# Patient Record
Sex: MALE | Race: WHITE | HISPANIC OR LATINO | Employment: UNEMPLOYED | URBAN - METROPOLITAN AREA
[De-identification: names, ages, dates, MRNs, and addresses within clinical notes are randomized per-mention and may not be internally consistent; named-entity substitution may affect disease eponyms.]

---

## 2023-01-23 ENCOUNTER — APPOINTMENT (EMERGENCY)
Dept: RADIOLOGY | Facility: HOSPITAL | Age: 38
End: 2023-01-23

## 2023-01-23 ENCOUNTER — HOSPITAL ENCOUNTER (EMERGENCY)
Facility: HOSPITAL | Age: 38
Discharge: HOME | End: 2023-01-23
Attending: EMERGENCY MEDICINE

## 2023-01-23 VITALS
OXYGEN SATURATION: 98 % | HEART RATE: 84 BPM | TEMPERATURE: 98.1 F | DIASTOLIC BLOOD PRESSURE: 86 MMHG | SYSTOLIC BLOOD PRESSURE: 168 MMHG | RESPIRATION RATE: 16 BRPM

## 2023-01-23 DIAGNOSIS — R55 NEAR SYNCOPE: Primary | ICD-10-CM

## 2023-01-23 DIAGNOSIS — R00.2 PALPITATIONS: ICD-10-CM

## 2023-01-23 LAB
ALBUMIN SERPL BCP-MCNC: 4.2 G/DL (ref 3.5–5)
ALP SERPL-CCNC: 82 U/L (ref 46–116)
ALT SERPL W P-5'-P-CCNC: 501 U/L (ref 12–78)
ANION GAP SERPL CALCULATED.3IONS-SCNC: 10 MMOL/L (ref 4–13)
AST SERPL W P-5'-P-CCNC: 201 U/L (ref 5–45)
ATRIAL RATE: 73 BPM
BASOPHILS # BLD AUTO: 0.02 THOUSANDS/ÂΜL (ref 0–0.1)
BASOPHILS NFR BLD AUTO: 0 % (ref 0–1)
BILIRUB SERPL-MCNC: 0.55 MG/DL (ref 0.2–1)
BUN SERPL-MCNC: 17 MG/DL (ref 5–25)
CALCIUM SERPL-MCNC: 9.4 MG/DL (ref 8.3–10.1)
CARDIAC TROPONIN I PNL SERPL HS: 2 NG/L
CHLORIDE SERPL-SCNC: 99 MMOL/L (ref 96–108)
CO2 SERPL-SCNC: 27 MMOL/L (ref 21–32)
CREAT SERPL-MCNC: 0.9 MG/DL (ref 0.6–1.3)
EOSINOPHIL # BLD AUTO: 0.1 THOUSAND/ÂΜL (ref 0–0.61)
EOSINOPHIL NFR BLD AUTO: 2 % (ref 0–6)
ERYTHROCYTE [DISTWIDTH] IN BLOOD BY AUTOMATED COUNT: 12.6 % (ref 11.6–15.1)
GFR SERPL CREATININE-BSD FRML MDRD: 107 ML/MIN/1.73SQ M
GLUCOSE SERPL-MCNC: 112 MG/DL (ref 65–140)
HCT VFR BLD AUTO: 45 % (ref 36.5–49.3)
HGB BLD-MCNC: 15.8 G/DL (ref 12–17)
IMM GRANULOCYTES # BLD AUTO: 0.02 THOUSAND/UL (ref 0–0.2)
IMM GRANULOCYTES NFR BLD AUTO: 0 % (ref 0–2)
LYMPHOCYTES # BLD AUTO: 1.3 THOUSANDS/ÂΜL (ref 0.6–4.47)
LYMPHOCYTES NFR BLD AUTO: 20 % (ref 14–44)
MCH RBC QN AUTO: 31.5 PG (ref 26.8–34.3)
MCHC RBC AUTO-ENTMCNC: 35.1 G/DL (ref 31.4–37.4)
MCV RBC AUTO: 90 FL (ref 82–98)
MONOCYTES # BLD AUTO: 0.67 THOUSAND/ÂΜL (ref 0.17–1.22)
MONOCYTES NFR BLD AUTO: 10 % (ref 4–12)
NEUTROPHILS # BLD AUTO: 4.51 THOUSANDS/ÂΜL (ref 1.85–7.62)
NEUTS SEG NFR BLD AUTO: 68 % (ref 43–75)
NRBC BLD AUTO-RTO: 0 /100 WBCS
P AXIS: 12 DEGREES
PLATELET # BLD AUTO: 287 THOUSANDS/UL (ref 149–390)
PMV BLD AUTO: 8.9 FL (ref 8.9–12.7)
POTASSIUM SERPL-SCNC: 3.5 MMOL/L (ref 3.5–5.3)
PR INTERVAL: 114 MS
PROT SERPL-MCNC: 7.9 G/DL (ref 6.4–8.4)
QRS AXIS: 88 DEGREES
QRSD INTERVAL: 100 MS
QT INTERVAL: 388 MS
QTC INTERVAL: 427 MS
RBC # BLD AUTO: 5.01 MILLION/UL (ref 3.88–5.62)
SODIUM SERPL-SCNC: 136 MMOL/L (ref 135–147)
T WAVE AXIS: 56 DEGREES
VENTRICULAR RATE: 73 BPM
WBC # BLD AUTO: 6.62 THOUSAND/UL (ref 4.31–10.16)

## 2023-01-23 NOTE — ED PROVIDER NOTES
History  Chief Complaint   Patient presents with   • Rapid Heart Rate     Pt states was at home working out when felt his heart rate increase  States stopped vaping last week and stopped doing a 500kcal deficit diet at the same time  Pt denies sick contacts  Patient is a 21-year-old male with no pertinent past medical history who reports he was working out approximately 1 hour ago at home doing 40 pound squats when he felt his heart rate shoot up to 160  States he felt near syncopal but did not pass out  Reports going down onto his knee  Currently reports mild right-sided chest pain  He also reports he "feels off"  Denies alcohol or illicit drug use  Denies headache  Denies neck pain  Denies radiation of pain to arms jaw teeth or back  No other complaints          None       History reviewed  No pertinent past medical history  History reviewed  No pertinent surgical history  History reviewed  No pertinent family history  I have reviewed and agree with the history as documented  E-Cigarette/Vaping   • E-Cigarette Use Former User      E-Cigarette/Vaping Substances     Social History     Tobacco Use   • Smoking status: Never   • Smokeless tobacco: Never   Vaping Use   • Vaping Use: Former   Substance Use Topics   • Alcohol use: Yes     Comment: social   • Drug use: Not Currently       Review of Systems   Constitutional: Negative for chills and fever  HENT: Negative for ear pain and sore throat  Respiratory: Negative for cough and shortness of breath  Cardiovascular: Negative for chest pain and palpitations  Gastrointestinal: Negative for abdominal pain, nausea and vomiting  Genitourinary: Negative for dysuria and hematuria  Musculoskeletal: Negative for arthralgias and back pain  Skin: Negative for color change and rash  Neurological: Negative for seizures and syncope  All other systems reviewed and are negative        Physical Exam  Physical Exam  Vitals and nursing note reviewed  Constitutional:       General: He is not in acute distress  Appearance: Normal appearance  He is not ill-appearing, toxic-appearing or diaphoretic  HENT:      Head: Normocephalic and atraumatic  Right Ear: Tympanic membrane, ear canal and external ear normal       Left Ear: Tympanic membrane, ear canal and external ear normal       Nose: Nose normal       Mouth/Throat:      Mouth: Mucous membranes are moist       Pharynx: Oropharynx is clear  Eyes:      Extraocular Movements: Extraocular movements intact  Conjunctiva/sclera: Conjunctivae normal       Pupils: Pupils are equal, round, and reactive to light  Cardiovascular:      Rate and Rhythm: Normal rate and regular rhythm  Pulses: Normal pulses  Heart sounds: Normal heart sounds  Pulmonary:      Effort: Pulmonary effort is normal       Breath sounds: Normal breath sounds  Abdominal:      General: Abdomen is flat  Bowel sounds are normal    Musculoskeletal:         General: Normal range of motion  Cervical back: Normal range of motion and neck supple  Skin:     General: Skin is warm and dry  Capillary Refill: Capillary refill takes less than 2 seconds  Neurological:      General: No focal deficit present  Mental Status: He is alert and oriented to person, place, and time  Mental status is at baseline           Vital Signs  ED Triage Vitals [01/23/23 1427]   Temperature Pulse Respirations Blood Pressure SpO2   98 1 °F (36 7 °C) 84 16 168/86 98 %      Temp Source Heart Rate Source Patient Position - Orthostatic VS BP Location FiO2 (%)   Tympanic Monitor Sitting Left arm --      Pain Score       --           Vitals:    01/23/23 1427   BP: 168/86   Pulse: 84   Patient Position - Orthostatic VS: Sitting         Visual Acuity      ED Medications  Medications - No data to display    Diagnostic Studies  Results Reviewed     Procedure Component Value Units Date/Time    HS Troponin I 4hr [[id number]]     Lab Status: No result Specimen: Blood     HS Troponin 0hr (reflex protocol) [[id number]]  (Normal) Collected: 01/23/23 1502    Lab Status: Final result Specimen: Blood from Arm, Left Updated: 01/23/23 1539     hs TnI 0hr 2 ng/L     HS Troponin I 2hr [[id number]]     Lab Status: No result Specimen: Blood     Comprehensive metabolic panel [[id number]]  (Abnormal) Collected: 01/23/23 1502    Lab Status: Final result Specimen: Blood from Arm, Left Updated: 01/23/23 1530     Sodium 136 mmol/L      Potassium 3 5 mmol/L      Chloride 99 mmol/L      CO2 27 mmol/L      ANION GAP 10 mmol/L      BUN 17 mg/dL      Creatinine 0 90 mg/dL      Glucose 112 mg/dL      Calcium 9 4 mg/dL       U/L       U/L      Alkaline Phosphatase 82 U/L      Total Protein 7 9 g/dL      Albumin 4 2 g/dL      Total Bilirubin 0 55 mg/dL      eGFR 107 ml/min/1 73sq m     Narrative:      [place] guidelines for Chronic Kidney Disease (CKD):   •  Stage 1 with normal or high GFR (GFR > 90 mL/min/1 73 square meters)  •  Stage 2 Mild CKD (GFR = 60-89 mL/min/1 73 square meters)  •  Stage 3A Moderate CKD (GFR = 45-59 mL/min/1 73 square meters)  •  Stage 3B Moderate CKD (GFR = 30-44 mL/min/1 73 square meters)  •  Stage 4 Severe CKD (GFR = 15-29 mL/min/1 73 square meters)  •  Stage 5 End Stage CKD (GFR <15 mL/min/1 73 square meters)  Note: GFR calculation is accurate only with a steady state creatinine    CBC and differential [[id number]] Collected: 01/23/23 1502    Lab Status: Final result Specimen: Blood from Arm, Left Updated: 01/23/23 1515     WBC 6 62 Thousand/uL      RBC 5 01 Million/uL      Hemoglobin 15 8 g/dL      Hematocrit 45 0 %      MCV 90 fL      MCH 31 5 pg      MCHC 35 1 g/dL      RDW 12 6 %      MPV 8 9 fL      Platelets 248 Thousands/uL      nRBC 0 /100 WBCs      Neutrophils Relative 68 %      Immat GRANS % 0 %      Lymphocytes Relative 20 %      Monocytes Relative 10 %      Eosinophils Relative 2 % Basophils Relative 0 %      Neutrophils Absolute 4 51 Thousands/µL      Immature Grans Absolute 0 02 Thousand/uL      Lymphocytes Absolute 1 30 Thousands/µL      Monocytes Absolute 0 67 Thousand/µL      Eosinophils Absolute 0 10 Thousand/µL      Basophils Absolute 0 02 Thousands/µL                  CT head without contrast   Final Result by Madison Kiran MD (01/23 1548)      No acute intracranial abnormality  Workstation performed: [id number]         XR chest 1 view    (Results Pending)              Procedures  ECG 12 Lead Documentation Only    Date/Time: 1/23/2023 4:44 PM  Performed by: Rupinder Chino PA-C  Authorized by: Rupinder Chino PA-C     Patient location:  ED  Rate:     ECG rate:  73    ECG rate assessment: normal    Rhythm:     Rhythm: sinus rhythm    Ectopy:     Ectopy: none    QRS:     QRS axis:  Normal    QRS intervals:  Normal  Conduction:     Conduction: normal    ST segments:     ST segments:  Normal  T waves:     T waves: normal               ED Course             HEART Risk Score    Flowsheet Row Most Recent Value   Heart Score Risk Calculator    History 0 Filed at: 01/23/2023 1644   ECG 0 Filed at: 01/23/2023 1644   Age 0 Filed at: 01/23/2023 1644   Risk Factors 0 Filed at: 01/23/2023 1644   Troponin 0 Filed at: 01/23/2023 1644   HEART Score 0 Filed at: 01/23/2023 1644                        SBIRT 20yo+    Flowsheet Row Most Recent Value   SBIRT (23 yo +)    In order to provide better care to our patients, we are screening all of our patients for alcohol and drug use  Would it be okay to ask you these screening questions? No Filed at: 01/23/2023 1504                    Medical Decision Making  27-year-old male with near syncopal event while lifting weights  Patient with normal EKG  High-sensitivity troponin less than 2  Heart score 0  Pt feels improved  Wants to be discharged   Discussed with patient at length that can not r/o transient arrhythmia such as SVT and advised to avoid lifting weights and close cardiology follow up  Return precautions given  Patient noted with elevated transaminases  Reports history of hepatitis C for which he is not being treated  He was advised to follow up with GI specialist regarding this for treatment  States he will do this once insurance issues are settled  Near syncope: acute illness or injury  Palpitations: acute illness or injury  Amount and/or Complexity of Data Reviewed  Labs: ordered  Radiology: ordered  Disposition  Final diagnoses:   Near syncope   Palpitations     Time reflects when diagnosis was documented in both MDM as applicable and the Disposition within this note     Time User Action Codes Description Comment    1/23/2023  4:41 PM Thi Hernandez Add [R55] Near syncope     1/23/2023  4:41 PM Thi Hernandez Add [R00 2] Palpitations       ED Disposition     ED Disposition   Discharge    Condition   Stable    Date/Time   Mon Jan 23, 2023  4:41 PM    Comment   Juan Owen discharge to home/self care  Follow-up Information     Follow up With Specialties Details Why [address] George Amezquita MD Cardiology   [facility]  [phone]            Patient's Medications    No medications on file       No discharge procedures on file      PDMP Review     None          ED Provider  Electronically Signed by           Jodie Kern PA-C  01/23/23 6337 [facility] CHRISTOPHER Issa  01/24/23 4881

## 2023-01-23 NOTE — DISCHARGE INSTRUCTIONS
Stay hydrated    Follow up with cardiologist Dr Aayush Thurston for further evaluation   Call for appointment    Avoid lifting weights until seen in follow up     Return to ED for worsening chest pain, new or worsening symptoms

## 2023-01-27 ENCOUNTER — CONSULT (OUTPATIENT)
Dept: CARDIOLOGY CLINIC | Facility: CLINIC | Age: 38
End: 2023-01-27

## 2023-01-27 VITALS
DIASTOLIC BLOOD PRESSURE: 70 MMHG | TEMPERATURE: 98 F | HEIGHT: 74 IN | OXYGEN SATURATION: 98 % | SYSTOLIC BLOOD PRESSURE: 130 MMHG | WEIGHT: 214 LBS | HEART RATE: 78 BPM | BODY MASS INDEX: 27.46 KG/M2

## 2023-01-27 DIAGNOSIS — R07.9 CHEST PAIN, UNSPECIFIED TYPE: Primary | ICD-10-CM

## 2023-01-27 NOTE — PROGRESS NOTES
[facility]  [address] [place] [address] [address]   [place], [address]    Cardiology Consultation    Juan Owen  [id number]  1985      Consult for: Chest pain  Appreciate consult by: No primary care provider on file  Discussion/Summary:     1  Chest pain, unspecified type  POCT ECG         Patient with atypical symptoms which occurred with exertion  He is low risk for cardiovascular events but given ongoing symptoms, will have treadmill stress test done   -He had elevated liver function testing which may be related to hepatitis C  He should follow-up with gastroenterology and have abdominal ultrasound done   -We will obtain lipid panel  Manger of blood work done recently was reviewed  Potassium levels were mildly reduced  HPI:     Juan Owen is a 44 y o  here for evaluation of chest pain and near syncope  Patient has had episodes of tightness and pain in his right shoulder/neck and chest that occurs at various times  he has had multiple episodes over the past few weeks  He went to the emergency room after developing symptoms while exercising  Work-up in the emergency room was negative except he had elevated liver function testing  He does have a history of hepatitis C  He denies any illicit drug use  There is no inciting events  He sometimes gets symptoms at rest as well  He has had a large amount of stress recently  No past medical history on file  Social History     Tobacco Use   • Smoking status: Never   • Smokeless tobacco: Never   Vaping Use   • Vaping Use: Former   Substance Use Topics   • Alcohol use: Yes     Comment: social   • Drug use: Not Currently      No family history on file  No past surgical history on file  No current outpatient medications on file  No Known Allergies    Review of Systems:   Review of Systems   Respiratory: Positive for chest tightness  Cardiovascular: Positive for chest pain   Negative for palpitations and leg swelling  Musculoskeletal: Positive for arthralgias and neck pain  All other systems reviewed and are negative  Physical Examination:     Vitals:    01/27/23 1020   BP: 130/70   BP Location: Left arm   Patient Position: Sitting   Cuff Size: Standard   Pulse: 78   Temp: 98 °F (36 7 °C)   SpO2: 98%   Weight: 97 1 kg (214 lb)   Height: 6' 2" (1 88 m)       Physical Exam   Constitutional: He appears healthy  No distress  Eyes: Pupils are equal, round, and reactive to light  Conjunctivae are normal    Neck: No JVD present  Cardiovascular: Normal rate, regular rhythm and normal heart sounds  Exam reveals no gallop and no friction rub  No murmur heard  Pulmonary/Chest: Effort normal and breath sounds normal  He has no wheezes  He has no rales  Musculoskeletal:         General: No tenderness, deformity or edema  Cervical back: Normal range of motion and neck supple  Neurological: He is alert and oriented to person, place, and time  Skin: Skin is warm and dry          Labs:     Lab Results   Component Value Date    WBC 6 62 01/23/2023    HGB 15 8 01/23/2023    HCT 45 0 01/23/2023    MCV 90 01/23/2023    RDW 12 6 01/23/2023     01/23/2023     BMP:  Lab Results   Component Value Date    SODIUM 136 01/23/2023    K 3 5 01/23/2023    CL 99 01/23/2023    CO2 27 01/23/2023    BUN 17 01/23/2023    CREATININE 0 90 01/23/2023    GLUC 112 01/23/2023    CALCIUM 9 4 01/23/2023    EGFR 107 01/23/2023     LFT:  Lab Results   Component Value Date     (H) 01/23/2023     (H) 01/23/2023    ALKPHOS 82 01/23/2023    TP 7 9 01/23/2023    ALB 4 2 01/23/2023      No results found for: [id number]  No results found for: HGBA1C  Lipid Profile:   No results found for: CHOLESTEROL, HDL, LDLCALC, TRIG  No results found for: CHOLESTEROL  No results found for: CKTOTAL, CKMB, CKMBINDEX, TROPONINI  No results found for: NTBNP   Recent Results (from the past 672 hour(s))   CBC and differential    Collection Time: 01/23/23  3:02 PM   Result Value Ref Range    WBC 6 62 4 31 - 10 16 Thousand/uL    RBC 5 01 3 88 - 5 62 Million/uL    Hemoglobin 15 8 12 0 - 17 0 g/dL    Hematocrit 45 0 36 5 - 49 3 %    MCV 90 82 - 98 fL    MCH 31 5 26 8 - 34 3 pg    MCHC 35 1 31 4 - 37 4 g/dL    RDW 12 6 11 6 - 15 1 %    MPV 8 9 8 9 - 12 7 fL    Platelets 810 279 - 652 Thousands/uL    nRBC 0 /100 WBCs    Neutrophils Relative 68 43 - 75 %    Immat GRANS % 0 0 - 2 %    Lymphocytes Relative 20 14 - 44 %    Monocytes Relative 10 4 - 12 %    Eosinophils Relative 2 0 - 6 %    Basophils Relative 0 0 - 1 %    Neutrophils Absolute 4 51 1 85 - 7 62 Thousands/µL    Immature Grans Absolute 0 02 0 00 - 0 20 Thousand/uL    Lymphocytes Absolute 1 30 0 60 - 4 47 Thousands/µL    Monocytes Absolute 0 67 0 17 - 1 22 Thousand/µL    Eosinophils Absolute 0 10 0 00 - 0 61 Thousand/µL    Basophils Absolute 0 02 0 00 - 0 10 Thousands/µL   Comprehensive metabolic panel    Collection Time: 01/23/23  3:02 PM   Result Value Ref Range    Sodium 136 135 - 147 mmol/L    Potassium 3 5 3 5 - 5 3 mmol/L    Chloride 99 96 - 108 mmol/L    CO2 27 21 - 32 mmol/L    ANION GAP 10 4 - 13 mmol/L    BUN 17 5 - 25 mg/dL    Creatinine 0 90 0 60 - 1 30 mg/dL    Glucose 112 65 - 140 mg/dL    Calcium 9 4 8 3 - 10 1 mg/dL     (H) 5 - 45 U/L     (H) 12 - 78 U/L    Alkaline Phosphatase 82 46 - 116 U/L    Total Protein 7 9 6 4 - 8 4 g/dL    Albumin 4 2 3 5 - 5 0 g/dL    Total Bilirubin 0 55 0 20 - 1 00 mg/dL    eGFR 107 ml/min/1 73sq m   HS Troponin 0hr (reflex protocol)    Collection Time: 01/23/23  3:02 PM   Result Value Ref Range    hs TnI 0hr 2 "Refer to ACS Flowchart"- see link ng/L   ECG 12 lead    Collection Time: 01/23/23  3:52 PM   Result Value Ref Range    Ventricular Rate 73 BPM    Atrial Rate 73 BPM    LA Interval 114 ms    QRSD Interval 100 ms    QT Interval 388 ms    QTC Interval 427 ms    P [place] 12 degrees    QRS Axis 88 degrees    T Wave Axis 56 degrees Imaging & Testing   I have personally reviewed pertinent reports  Cardiac Testing   No results found for this or any previous visit  EKG: Personally reviewed  Normal sinus rhythm with rate 75 bpm      Kayla Katz DO, [place]  [phone]  Please call with any questions

## 2023-11-30 ENCOUNTER — HOSPITAL ENCOUNTER (EMERGENCY)
Facility: HOSPITAL | Age: 38
Discharge: HOME | End: 2023-11-30
Attending: EMERGENCY MEDICINE
Payer: COMMERCIAL

## 2023-11-30 VITALS
DIASTOLIC BLOOD PRESSURE: 88 MMHG | HEIGHT: 74 IN | TEMPERATURE: 98.7 F | SYSTOLIC BLOOD PRESSURE: 158 MMHG | OXYGEN SATURATION: 98 % | RESPIRATION RATE: 18 BRPM | BODY MASS INDEX: 27.59 KG/M2 | HEART RATE: 87 BPM | WEIGHT: 215 LBS

## 2023-11-30 DIAGNOSIS — S46.911A STRAIN OF RIGHT SHOULDER, INITIAL ENCOUNTER: Primary | ICD-10-CM

## 2023-11-30 PROCEDURE — 99283 EMERGENCY DEPT VISIT LOW MDM: CPT

## 2023-11-30 PROCEDURE — 96372 THER/PROPH/DIAG INJ SC/IM: CPT

## 2023-11-30 PROCEDURE — 99284 EMERGENCY DEPT VISIT MOD MDM: CPT | Performed by: EMERGENCY MEDICINE

## 2023-11-30 RX ORDER — KETOROLAC TROMETHAMINE 30 MG/ML
15 INJECTION, SOLUTION INTRAMUSCULAR; INTRAVENOUS ONCE
Status: COMPLETED | OUTPATIENT
Start: 2023-11-30 | End: 2023-11-30

## 2023-11-30 RX ORDER — LIDOCAINE 50 MG/G
1 PATCH TOPICAL DAILY
Qty: 15 PATCH | Refills: 0 | Status: SHIPPED | OUTPATIENT
Start: 2023-11-30

## 2023-11-30 RX ORDER — NAPROXEN 500 MG/1
500 TABLET ORAL 2 TIMES DAILY WITH MEALS
Qty: 30 TABLET | Refills: 0 | Status: SHIPPED | OUTPATIENT
Start: 2023-11-30

## 2023-11-30 RX ORDER — LIDOCAINE 50 MG/G
1 PATCH TOPICAL ONCE
Status: DISCONTINUED | OUTPATIENT
Start: 2023-11-30 | End: 2023-11-30 | Stop reason: HOSPADM

## 2023-11-30 RX ADMIN — LIDOCAINE 1 PATCH: 50 PATCH CUTANEOUS at 10:38

## 2023-11-30 RX ADMIN — KETOROLAC TROMETHAMINE 15 MG: 30 INJECTION, SOLUTION INTRAMUSCULAR at 10:38

## 2023-11-30 NOTE — Clinical Note
Quin Rosales was seen and treated in our emergency department on 11/30/2023. Diagnosis:     Roge  . He may return on this date: 12/05/2023         If you have any questions or concerns, please don't hesitate to call.       Carlyle Beltran MD    ______________________________           _______________          _______________  Hospital Representative                              Date                                Time

## 2023-11-30 NOTE — DISCHARGE INSTRUCTIONS
We have given you prescription for Naprosyn to help with inflammation as well as Lidoderm numbing patches which you can put on your shoulder. We have provided you with a sling for comfort. Make sure you are taking your arm out of the sling a few times a day and taking your shoulder through a full range of motion. If you have any severe worsening of pain, new weakness or numbness, return to the emergency department. We have provided information to follow-up with orthopedic surgery in the next 1 to 2 weeks. We have also provided information for physical therapy.

## 2023-11-30 NOTE — ED PROVIDER NOTES
History  Chief Complaint   Patient presents with    Shoulder Pain     Right shoulder pain that began in neck, unable to move. Was lifting object 2 days ago over head and and lost control. HPI  Patient is a 50-year-old male presenting for evaluation of right-sided shoulder and neck pain. Patient states he was carrying a cast iron tub downstairs and states that he felt a pressure in his shoulder and now for the past 2 days has been having severe pain with moving his right arm. Patient denies any weakness or numbness of his right upper extremity but does have range of motion limited by his pain. Patient denies any prior injury to the same arm or shoulder. Patient states that he took 1000 mg Tylenol earlier this morning. None       History reviewed. No pertinent past medical history. History reviewed. No pertinent surgical history. History reviewed. No pertinent family history. I have reviewed and agree with the history as documented. E-Cigarette/Vaping    E-Cigarette Use Current Every Day User      E-Cigarette/Vaping Substances    Nicotine Yes      Social History     Tobacco Use    Smoking status: Never    Smokeless tobacco: Never   Vaping Use    Vaping Use: Every day    Substances: Nicotine   Substance Use Topics    Alcohol use: Yes     Comment: Pt states "I was just on a flores for two weeks and stopped four days ago"    Drug use: Not Currently       Review of Systems   Cardiovascular:  Negative for chest pain. Musculoskeletal:  Positive for arthralgias (Right shoulder) and neck pain. Negative for back pain. Neurological:  Negative for weakness and numbness. All other systems reviewed and are negative. Physical Exam  Physical Exam  Vitals and nursing note reviewed. Constitutional:       General: He is not in acute distress. Appearance: He is well-developed. He is not diaphoretic.       Comments: Uncomfortable appearing but nontoxic nondistressed   HENT:      Head: Normocephalic and atraumatic. Right Ear: External ear normal.      Left Ear: External ear normal.      Nose: Nose normal.      Mouth/Throat:      Pharynx: No oropharyngeal exudate. Eyes:      Conjunctiva/sclera: Conjunctivae normal.      Pupils: Pupils are equal, round, and reactive to light. Cardiovascular:      Rate and Rhythm: Normal rate and regular rhythm. Heart sounds: Normal heart sounds. No murmur heard. No friction rub. No gallop. Pulmonary:      Effort: Pulmonary effort is normal. No respiratory distress. Breath sounds: Normal breath sounds. No wheezing. Chest:      Chest wall: No tenderness. Abdominal:      General: Bowel sounds are normal. There is no distension. Palpations: Abdomen is soft. There is no mass. Tenderness: There is no abdominal tenderness. There is no guarding or rebound. Musculoskeletal:         General: No deformity. Comments: No midline C/T/L-spine tenderness, step-off, deformity. Spasm and severe tenderness of the right trapezius muscle and anterior shoulder. No overlying skin changes, swelling, ecchymosis. Able to abduct and abduct right shoulder despite pain. 2+ radial pulse. Intact  strength. Full sensation throughout right upper extremity. Skin:     General: Skin is warm and dry. Capillary Refill: Capillary refill takes less than 2 seconds. Neurological:      Mental Status: He is alert and oriented to person, place, and time.    Psychiatric:         Behavior: Behavior normal.         Vital Signs  ED Triage Vitals [11/30/23 1006]   Temperature Pulse Respirations Blood Pressure SpO2   98.7 °F (37.1 °C) 87 18 158/88 98 %      Temp Source Heart Rate Source Patient Position - Orthostatic VS BP Location FiO2 (%)   Oral Monitor Sitting Left arm --      Pain Score       4           Vitals:    11/30/23 1006   BP: 158/88   Pulse: 87   Patient Position - Orthostatic VS: Sitting         Visual Acuity      ED Medications  Medications   ketorolac (TORADOL) injection 15 mg (has no administration in time range)   lidocaine (LIDODERM) 5 % patch 1 patch (has no administration in time range)       Diagnostic Studies  Results Reviewed       None                   No orders to display              Procedures  Procedures         ED Course                               SBIRT 22yo+      Flowsheet Row Most Recent Value   Initial Alcohol Screen: US AUDIT-C     1. How often do you have a drink containing alcohol? 5 Filed at: 11/30/2023 1012   2. How many drinks containing alcohol do you have on a typical day you are drinking? 4 Filed at: 11/30/2023 1012   3a. Male UNDER 65: How often do you have five or more drinks on one occasion? 1 Filed at: 11/30/2023 1012   Audit-C Score 10 Filed at: 11/30/2023 1012                      Medical Decision Making  I obtained history from the patient. I reviewed external medical documentation. Patient was evaluated in emergency department on 1/23/2023 for rapid heart rate on exertion, had negative troponins at that time, followed up with cardiology as an outpatient. Patient with readily reproducible pain and tenderness likely representing a muscle strain based on patient's description of symptoms, physical exam.  Shared decision making performed and imaging deferred at this time. Treated symptomatically with Toradol, Lidoderm, placed in a shoulder sling, provided with orthopedics and physical therapy follow-up, discharged with return precautions.              Disposition  Final diagnoses:   Strain of right shoulder, initial encounter     Time reflects when diagnosis was documented in both MDM as applicable and the Disposition within this note       Time User Action Codes Description Comment    11/30/2023 10:25 AM Melvi Landaverde Add [D90.802Y] Strain of right shoulder, initial encounter           ED Disposition       ED Disposition   Discharge    Condition   Stable    Date/Time   Thu Nov 30, 2023 2615 [facility] discharge to home/self care. Follow-up Information       Follow up With Specialties Details Why Contact Info Additional Information    [address] Specialists [facility] Orthopedic Surgery   [address], [place] [address] [phone] [address] Specialists [facility], [address], [address], [place], [phone]    Physical Therapy at [facility] Physical Therapy   [facility] [address] [facility]  [phone] Physical Therapy at [facility], [address], [facility], [address]            Patient's Medications   Discharge Prescriptions    LIDOCAINE (LIDODERM) 5 %    Apply 1 patch topically over 12 hours daily Remove & Discard patch within 12 hours or as directed by MD       Start Date: 11/30/2023End Date: --       Order Dose: 1 patch       Quantity: 15 patch    Refills: 0    NAPROXEN (NAPROSYN) 500 MG TABLET    Take 1 tablet (500 mg total) by mouth 2 (two) times a day with meals       Start Date: 11/30/2023End Date: --       Order Dose: 500 mg       Quantity: 30 tablet    Refills: 0       No discharge procedures on file.     PDMP Review       None            ED Provider  Electronically Signed by             Karlie Noriega MD  11/30/23 9654

## 2024-07-22 ENCOUNTER — TELEPHONE (OUTPATIENT)
Age: 39
End: 2024-07-22

## 2024-07-22 NOTE — TELEPHONE ENCOUNTER
Patients GI provider:  Dr. Mcrae    Number to return call: [phone]  Reason for call: Pt was returning a call to verify his insurance. Pt gave the same numbers as the ones in Epic. Pt said he called his insurance and he is active. He will bring the info in at his appt and come in a little early to try and get it straightened out    Scheduled procedure/appointment date if applicable: Apt/7/24/24

## 2024-07-26 NOTE — TELEPHONE ENCOUNTER
Pt calling to try and fix his insurance, he doesn't have his card but call the company to get his info but it is still being rejected. There is some confusion on wether its [facility] medicaid or [place] [employer]. He is going to ask a rep from his plan to call use so we can try to get all the correct info. He also needed to be scheduled at a [place] office, NOT [place], since [place]. First available is in December so we've booked that as a self pay for now, Estimate was run and given (details in appt note) and we will remove self pay once insurance is cleared up.

## 2024-07-30 ENCOUNTER — NURSE TRIAGE (OUTPATIENT)
Age: 39
End: 2024-07-30

## 2024-07-30 NOTE — TELEPHONE ENCOUNTER
Pt calling in with help for his [employer]. I provided him with the help desk number.   Reason for Disposition  • Information only question and nurse able to answer    Protocols used: Information Only Call - No Triage-ADULT-[place]